# Patient Record
Sex: MALE | Race: WHITE
[De-identification: names, ages, dates, MRNs, and addresses within clinical notes are randomized per-mention and may not be internally consistent; named-entity substitution may affect disease eponyms.]

---

## 2018-02-17 ENCOUNTER — HOSPITAL ENCOUNTER (EMERGENCY)
Dept: HOSPITAL 19 - COL.ER | Age: 43
Discharge: HOME | End: 2018-02-17
Payer: OTHER GOVERNMENT

## 2018-02-17 VITALS — HEART RATE: 87 BPM

## 2018-02-17 VITALS — TEMPERATURE: 98.3 F | DIASTOLIC BLOOD PRESSURE: 85 MMHG | SYSTOLIC BLOOD PRESSURE: 144 MMHG

## 2018-02-17 VITALS — WEIGHT: 270.51 LBS | HEIGHT: 72.01 IN | BODY MASS INDEX: 36.64 KG/M2

## 2018-02-17 DIAGNOSIS — Z79.51: ICD-10-CM

## 2018-02-17 DIAGNOSIS — I10: ICD-10-CM

## 2018-02-17 DIAGNOSIS — Z87.891: ICD-10-CM

## 2018-02-17 DIAGNOSIS — Z79.82: ICD-10-CM

## 2018-02-17 DIAGNOSIS — Z71.1: Primary | ICD-10-CM

## 2018-06-06 ENCOUNTER — HOSPITAL ENCOUNTER (OUTPATIENT)
Dept: HOSPITAL 19 - EUO | Age: 43
Discharge: HOME | End: 2018-06-06
Attending: INTERNAL MEDICINE
Payer: OTHER GOVERNMENT

## 2018-06-06 VITALS — SYSTOLIC BLOOD PRESSURE: 119 MMHG | TEMPERATURE: 98.3 F | DIASTOLIC BLOOD PRESSURE: 79 MMHG | HEART RATE: 62 BPM

## 2018-06-06 VITALS — BODY MASS INDEX: 37.8 KG/M2 | WEIGHT: 279.11 LBS | HEIGHT: 72.01 IN

## 2018-06-06 DIAGNOSIS — Z79.899: ICD-10-CM

## 2018-06-06 DIAGNOSIS — J45.40: Primary | ICD-10-CM

## 2018-07-09 ENCOUNTER — HOSPITAL ENCOUNTER (OUTPATIENT)
Dept: HOSPITAL 19 - EUO | Age: 43
End: 2018-07-09
Attending: INTERNAL MEDICINE
Payer: OTHER GOVERNMENT

## 2018-07-09 DIAGNOSIS — J45.40: Primary | ICD-10-CM

## 2020-11-09 ENCOUNTER — HOSPITAL ENCOUNTER (EMERGENCY)
Dept: HOSPITAL 19 - COL.ER | Age: 45
Discharge: HOME | End: 2020-11-09
Attending: EMERGENCY MEDICINE
Payer: OTHER GOVERNMENT

## 2020-11-09 VITALS — TEMPERATURE: 98.9 F

## 2020-11-09 VITALS — DIASTOLIC BLOOD PRESSURE: 107 MMHG | SYSTOLIC BLOOD PRESSURE: 140 MMHG | HEART RATE: 70 BPM

## 2020-11-09 VITALS — WEIGHT: 275.58 LBS | HEIGHT: 72.01 IN | BODY MASS INDEX: 37.33 KG/M2

## 2020-11-09 DIAGNOSIS — J45.909: ICD-10-CM

## 2020-11-09 DIAGNOSIS — U07.1: Primary | ICD-10-CM

## 2020-11-09 DIAGNOSIS — Z79.82: ICD-10-CM

## 2020-11-09 DIAGNOSIS — I10: ICD-10-CM

## 2021-04-19 ENCOUNTER — HOSPITAL ENCOUNTER (EMERGENCY)
Dept: HOSPITAL 19 - COL.ER | Age: 46
Discharge: HOME | End: 2021-04-19
Attending: EMERGENCY MEDICINE
Payer: OTHER GOVERNMENT

## 2021-04-19 VITALS — DIASTOLIC BLOOD PRESSURE: 122 MMHG | TEMPERATURE: 98 F | SYSTOLIC BLOOD PRESSURE: 201 MMHG

## 2021-04-19 VITALS — HEART RATE: 72 BPM

## 2021-04-19 VITALS — WEIGHT: 300.71 LBS | BODY MASS INDEX: 40.73 KG/M2 | HEIGHT: 72.01 IN

## 2021-04-19 DIAGNOSIS — Z79.82: ICD-10-CM

## 2021-04-19 DIAGNOSIS — M25.561: Primary | ICD-10-CM

## 2021-04-19 DIAGNOSIS — Z79.51: ICD-10-CM

## 2021-04-19 PROCEDURE — 31869: CPT

## 2021-04-19 PROCEDURE — L1830 KO IMMOB CANVAS LONG PRE OTS: HCPCS

## 2021-04-19 PROCEDURE — L1846 KO W ADJ FLEX/EXT ROTAT MOLD: HCPCS

## 2023-04-19 ENCOUNTER — HOSPITAL ENCOUNTER (INPATIENT)
Dept: HOSPITAL 19 - COL.ER | Age: 48
LOS: 4 days | Discharge: HOME | DRG: 438 | End: 2023-04-23
Attending: INTERNAL MEDICINE | Admitting: INTERNAL MEDICINE
Payer: COMMERCIAL

## 2023-04-19 VITALS — SYSTOLIC BLOOD PRESSURE: 159 MMHG

## 2023-04-19 VITALS — DIASTOLIC BLOOD PRESSURE: 88 MMHG | SYSTOLIC BLOOD PRESSURE: 147 MMHG | HEART RATE: 96 BPM | TEMPERATURE: 98.2 F

## 2023-04-19 VITALS — HEART RATE: 70 BPM | DIASTOLIC BLOOD PRESSURE: 94 MMHG | SYSTOLIC BLOOD PRESSURE: 161 MMHG | TEMPERATURE: 97.4 F

## 2023-04-19 VITALS — SYSTOLIC BLOOD PRESSURE: 137 MMHG

## 2023-04-19 VITALS — TEMPERATURE: 97.6 F | HEART RATE: 100 BPM | SYSTOLIC BLOOD PRESSURE: 137 MMHG | DIASTOLIC BLOOD PRESSURE: 96 MMHG

## 2023-04-19 VITALS — DIASTOLIC BLOOD PRESSURE: 95 MMHG | TEMPERATURE: 98.3 F | SYSTOLIC BLOOD PRESSURE: 160 MMHG | HEART RATE: 70 BPM

## 2023-04-19 VITALS — DIASTOLIC BLOOD PRESSURE: 82 MMHG | SYSTOLIC BLOOD PRESSURE: 159 MMHG

## 2023-04-19 VITALS — TEMPERATURE: 97.7 F | SYSTOLIC BLOOD PRESSURE: 157 MMHG | DIASTOLIC BLOOD PRESSURE: 95 MMHG | HEART RATE: 102 BPM

## 2023-04-19 VITALS — HEART RATE: 85 BPM | TEMPERATURE: 98 F | DIASTOLIC BLOOD PRESSURE: 91 MMHG | SYSTOLIC BLOOD PRESSURE: 180 MMHG

## 2023-04-19 VITALS — WEIGHT: 315 LBS | HEIGHT: 72.01 IN | BODY MASS INDEX: 42.66 KG/M2

## 2023-04-19 VITALS — SYSTOLIC BLOOD PRESSURE: 157 MMHG

## 2023-04-19 VITALS — SYSTOLIC BLOOD PRESSURE: 160 MMHG | DIASTOLIC BLOOD PRESSURE: 95 MMHG | HEART RATE: 70 BPM | TEMPERATURE: 98.3 F

## 2023-04-19 DIAGNOSIS — J45.909: ICD-10-CM

## 2023-04-19 DIAGNOSIS — I10: ICD-10-CM

## 2023-04-19 DIAGNOSIS — F43.10: ICD-10-CM

## 2023-04-19 DIAGNOSIS — N17.9: ICD-10-CM

## 2023-04-19 DIAGNOSIS — K85.20: Primary | ICD-10-CM

## 2023-04-19 DIAGNOSIS — E66.9: ICD-10-CM

## 2023-04-19 DIAGNOSIS — K76.0: ICD-10-CM

## 2023-04-19 DIAGNOSIS — F10.90: ICD-10-CM

## 2023-04-19 DIAGNOSIS — E83.42: ICD-10-CM

## 2023-04-19 DIAGNOSIS — K55.059: ICD-10-CM

## 2023-04-19 DIAGNOSIS — Z79.82: ICD-10-CM

## 2023-04-19 LAB
ALBUMIN SERPL-MCNC: 3.6 GM/DL (ref 3.5–5)
ALP SERPL-CCNC: 71 U/L (ref 40–150)
ALT SERPL-CCNC: 98 U/L (ref 0–55)
ANION GAP SERPL CALC-SCNC: 12 MMOL/L (ref 7–16)
AST SERPL-CCNC: 110 U/L (ref 5–34)
BASOPHILS # BLD: 0.1 K/MM3 (ref 0–0.2)
BASOPHILS NFR BLD AUTO: 0.8 % (ref 0–2)
BILIRUB SERPL-MCNC: 1 MG/DL (ref 0.2–1.2)
BUN SERPL-MCNC: 20 MG/DL (ref 9–21)
CALCIUM SERPL-MCNC: 8.8 MG/DL (ref 8.4–10.2)
CHLORIDE SERPL-SCNC: 105 MMOL/L (ref 98–107)
CHOLEST SPEC-SCNC: 165 MG/DL (ref 0–199)
CHOLEST/HDLC SERPL-SRTO: 5
CO2 SERPL-SCNC: 20 MMOL/L (ref 22–29)
CREAT SERPL-SCNC: 0.9 MG/DL (ref 0.72–1.25)
EOSINOPHIL # BLD: 0.5 K/MM3 (ref 0–0.7)
EOSINOPHIL NFR BLD: 5.2 % (ref 0–4)
ERYTHROCYTE [DISTWIDTH] IN BLOOD BY AUTOMATED COUNT: 11.8 % (ref 11.5–14.5)
GLUCOSE SERPL-MCNC: 139 MG/DL (ref 70–99)
GRANULOCYTES # BLD AUTO: 62.2 % (ref 42.2–75.2)
HCT VFR BLD AUTO: 43.6 % (ref 42–52)
HDLC SERPL-MCNC: 33 MG/DL (ref 40–60)
HGB BLD-MCNC: 15.3 G/DL (ref 13.5–18)
LDLC SERPL-MCNC: 35 MG/DL
LIPASE SERPL-CCNC: 290 U/L (ref 8–78)
LYMPHOCYTES # BLD: 2.1 K/MM3 (ref 1.2–3.4)
LYMPHOCYTES NFR BLD: 21.3 % (ref 20–51)
MCH RBC QN AUTO: 36 PG (ref 27–31)
MCHC RBC AUTO-ENTMCNC: 35 G/DL (ref 33–37)
MCV RBC AUTO: 102 FL (ref 80–100)
MONOCYTES # BLD: 1 K/MM3 (ref 0.1–0.6)
MONOCYTES NFR BLD AUTO: 10.2 % (ref 1.7–9.3)
NEUTROPHILS # BLD: 6.2 K/MM3 (ref 1.4–6.5)
PH UR STRIP.AUTO: 5 [PH] (ref 5–8.5)
PLATELET # BLD AUTO: 177 K/MM3 (ref 130–400)
PMV BLD AUTO: 9.4 FL (ref 7.4–10.4)
POTASSIUM SERPL-SCNC: 4.3 MMOL/L (ref 3.5–4.5)
PROT SERPL-MCNC: 6.8 GM/DL (ref 6.2–8.1)
RBC # BLD AUTO: 4.29 M/MM3 (ref 4.2–5.6)
RBC # UR: (no result) /HPF (ref 0–2)
SODIUM SERPL-SCNC: 137 MMOL/L (ref 136–145)
SP GR UR STRIP.AUTO: >=1.03 (ref 1–1.03)
SQUAMOUS # URNS: (no result) /HPF (ref 0–10)
TRIGL SERPL-MCNC: 485 MG/DL (ref 0–149)
URN COLLECT METHOD CLASS: (no result)
UROBILINOGEN UR STRIP.AUTO-MCNC: 0.2 E.U/DL (ref 0.2–1)

## 2023-04-19 NOTE — NUR
PCA pump of dilaudid initiated at 11:48 , instruction given to patient on the
use of the medication. Patient verbalized understanding.

## 2023-04-19 NOTE — NUR
Patient c/o of severe pain at RUQ. Dr. Madrigal at the bedside and gave a verbal
order to change PCA Basal rate to 0.3 and increase it to 0.4 if patient is
still in severe pain and not getting relief. PCA basal rate changed to 0.3 per
orders. See e-mar for changes.

## 2023-04-19 NOTE — NUR
SW met with pt for intake. Pt reports living in [place], [place] with spouse
Miley who is identified as NOK and was present. Confirmed phone number as
[phone]. Pt reports having 3 sets of stairs at home with about 12 steps
each and having challenges getting up and down them. Pt denies using any DME
at home or oxygen. Pt reports PCP as Dr. Cota and confirmed insurance as [employer]
and . Pt reports using [employer] and does not have any issues. Pt
would like resources to assist discontinuing drinking. Pt would like to fill
out DPOA paperwork and is aware of need 2 individuals present to sign. SW
provided resource list and DPOA paperwork.
 
 
******Discharge plan*****
 
Home with wife

## 2023-04-19 NOTE — NUR
Patient maxed up his pca and replaced a new one at 1636. Patient states his
pain level is 7/10. Basal rate increased to 0.4 per verbal order from Dr. Madrigal.

## 2023-04-19 NOTE — NUR
Initial visit; Patient and his wife both thanked  for stopping though
said Pat wasn't receptive to prayer or Spiritual Care at this time.

## 2023-04-19 NOTE — NUR
Patient up in a semi lutz position in bed. IVF infusing without difficulty.
Assessment completed and patient reports of severe pain at RUQ. Patient rated
pain level 8/10 and dilaudid administered. Patient was informed to notify
nurse and pain does not subside and can have pain medicaiton 30 minutes per
parameter. Patient verbalized understanding. Call bell within reach and family
member at the bedside.

## 2023-04-20 VITALS — HEART RATE: 98 BPM | TEMPERATURE: 98.5 F | DIASTOLIC BLOOD PRESSURE: 69 MMHG | SYSTOLIC BLOOD PRESSURE: 123 MMHG

## 2023-04-20 VITALS — SYSTOLIC BLOOD PRESSURE: 147 MMHG | HEART RATE: 94 BPM | DIASTOLIC BLOOD PRESSURE: 90 MMHG | TEMPERATURE: 98.3 F

## 2023-04-20 VITALS — DIASTOLIC BLOOD PRESSURE: 62 MMHG | TEMPERATURE: 98.3 F | SYSTOLIC BLOOD PRESSURE: 148 MMHG | HEART RATE: 98 BPM

## 2023-04-20 VITALS — SYSTOLIC BLOOD PRESSURE: 123 MMHG

## 2023-04-20 VITALS — SYSTOLIC BLOOD PRESSURE: 123 MMHG | HEART RATE: 96 BPM | TEMPERATURE: 99.3 F | DIASTOLIC BLOOD PRESSURE: 73 MMHG

## 2023-04-20 VITALS — SYSTOLIC BLOOD PRESSURE: 152 MMHG | DIASTOLIC BLOOD PRESSURE: 79 MMHG | TEMPERATURE: 99.2 F | HEART RATE: 102 BPM

## 2023-04-20 VITALS — SYSTOLIC BLOOD PRESSURE: 157 MMHG | TEMPERATURE: 97.6 F | HEART RATE: 95 BPM | DIASTOLIC BLOOD PRESSURE: 86 MMHG

## 2023-04-20 VITALS — SYSTOLIC BLOOD PRESSURE: 152 MMHG

## 2023-04-20 VITALS — SYSTOLIC BLOOD PRESSURE: 147 MMHG

## 2023-04-20 VITALS — SYSTOLIC BLOOD PRESSURE: 157 MMHG

## 2023-04-20 LAB
ALBUMIN SERPL-MCNC: 3.5 GM/DL (ref 3.5–5)
ALP SERPL-CCNC: 67 U/L (ref 40–150)
ALT SERPL-CCNC: 73 U/L (ref 0–55)
ANION GAP SERPL CALC-SCNC: 12 MMOL/L (ref 7–16)
AST SERPL-CCNC: 65 U/L (ref 5–34)
BILIRUB SERPL-MCNC: 1.9 MG/DL (ref 0.2–1.2)
BUN SERPL-MCNC: 23 MG/DL (ref 9–21)
CALCIUM SERPL-MCNC: 8.5 MG/DL (ref 8.4–10.2)
CHLORIDE SERPL-SCNC: 103 MMOL/L (ref 98–107)
CO2 SERPL-SCNC: 23 MMOL/L (ref 22–29)
CREAT SERPL-SCNC: 1.18 MG/DL (ref 0.72–1.25)
EOSINOPHIL NFR BLD: 1 % (ref 0–4)
ERYTHROCYTE [DISTWIDTH] IN BLOOD BY AUTOMATED COUNT: 12.3 % (ref 11.5–14.5)
GLUCOSE SERPL-MCNC: 154 MG/DL (ref 70–99)
HCT VFR BLD AUTO: 44.4 % (ref 42–52)
HGB BLD-MCNC: 14.5 G/DL (ref 13.5–18)
HYPOCHROMIA BLD QL SMEAR: (no result)
LYMPHOCYTES NFR BLD MANUAL: 11 % (ref 20–51)
MACROCYTES BLD QL SMEAR: (no result)
MAGNESIUM SERPL-MCNC: 1.6 MG/DL (ref 1.6–2.6)
MCH RBC QN AUTO: 35 PG (ref 27–31)
MCHC RBC AUTO-ENTMCNC: 33 G/DL (ref 33–37)
MCV RBC AUTO: 108 FL (ref 80–100)
MONOCYTES NFR BLD: 12 % (ref 1.7–9.3)
NEUTS BAND NFR BLD: 15 % (ref 0–10)
NEUTS SEG NFR BLD MANUAL: 61 % (ref 42–75.2)
PLATELET # BLD AUTO: 171 K/MM3 (ref 130–400)
PLATELET BLD QL SMEAR: NORMAL
PMV BLD AUTO: 9.7 FL (ref 7.4–10.4)
POTASSIUM SERPL-SCNC: 4.5 MMOL/L (ref 3.5–4.5)
PROT SERPL-MCNC: 6.9 GM/DL (ref 6.2–8.1)
RBC # BLD AUTO: 4.12 M/MM3 (ref 4.2–5.6)
SODIUM SERPL-SCNC: 138 MMOL/L (ref 136–145)
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
## 2023-04-20 NOTE — NUR
Spoke with PENNY Fields about patients CPAP. Patient reports he has not been
able to sleep well due to not wearing his CPAP and being diagnosed with severe
MARISELA. States "staff" told him he could not wear it while on the PCA. PENNY Fields approved wearing of CPAP. Spoke with RT Jack to notify of patient being
placed on home CPAP. States end tidal monitor can be shut off while on CPAP.
Instructed patient to notify this nurse if decides to remove CPAP. Verbalizes
understanding. Call light in reach. Will monitor.

## 2023-04-20 NOTE — NUR
PCA PUMPED CLEARED AT THIS TIME. WHEN CLEARNING PUMP, NOTED PUMP HAD NOT BEEN
CLEARED. DOCUMENTED TOTAL INTAKE.

## 2023-04-21 VITALS — DIASTOLIC BLOOD PRESSURE: 79 MMHG | SYSTOLIC BLOOD PRESSURE: 153 MMHG | HEART RATE: 78 BPM | TEMPERATURE: 98.6 F

## 2023-04-21 VITALS — DIASTOLIC BLOOD PRESSURE: 76 MMHG | HEART RATE: 102 BPM | SYSTOLIC BLOOD PRESSURE: 116 MMHG | TEMPERATURE: 99.2 F

## 2023-04-21 VITALS — DIASTOLIC BLOOD PRESSURE: 59 MMHG | SYSTOLIC BLOOD PRESSURE: 159 MMHG | TEMPERATURE: 98.2 F | HEART RATE: 100 BPM

## 2023-04-21 VITALS — SYSTOLIC BLOOD PRESSURE: 167 MMHG

## 2023-04-21 VITALS — TEMPERATURE: 98 F | DIASTOLIC BLOOD PRESSURE: 91 MMHG | HEART RATE: 100 BPM | SYSTOLIC BLOOD PRESSURE: 150 MMHG

## 2023-04-21 VITALS — SYSTOLIC BLOOD PRESSURE: 150 MMHG

## 2023-04-21 VITALS — SYSTOLIC BLOOD PRESSURE: 159 MMHG

## 2023-04-21 VITALS — SYSTOLIC BLOOD PRESSURE: 125 MMHG | TEMPERATURE: 98.5 F | DIASTOLIC BLOOD PRESSURE: 70 MMHG | HEART RATE: 77 BPM

## 2023-04-21 VITALS — SYSTOLIC BLOOD PRESSURE: 143 MMHG | TEMPERATURE: 98.4 F | HEART RATE: 91 BPM | DIASTOLIC BLOOD PRESSURE: 78 MMHG

## 2023-04-21 VITALS — SYSTOLIC BLOOD PRESSURE: 116 MMHG

## 2023-04-21 VITALS — SYSTOLIC BLOOD PRESSURE: 143 MMHG

## 2023-04-21 LAB
ALBUMIN SERPL-MCNC: 3.1 GM/DL (ref 3.5–5)
ALP SERPL-CCNC: 57 U/L (ref 40–150)
ALT SERPL-CCNC: 47 U/L (ref 0–55)
ANION GAP SERPL CALC-SCNC: 11 MMOL/L (ref 7–16)
AST SERPL-CCNC: 36 U/L (ref 5–34)
BASOPHILS NFR BLD MANUAL: 1 % (ref 0–2)
BILIRUB SERPL-MCNC: 1.6 MG/DL (ref 0.2–1.2)
BUN SERPL-MCNC: 33 MG/DL (ref 9–21)
CALCIUM SERPL-MCNC: 8.3 MG/DL (ref 8.4–10.2)
CHLORIDE SERPL-SCNC: 105 MMOL/L (ref 98–107)
CO2 SERPL-SCNC: 21 MMOL/L (ref 22–29)
CREAT SERPL-SCNC: 3.39 MG/DL (ref 0.72–1.25)
CREAT SERPL-SCNC: 3.43 MG/DL (ref 0.72–1.25)
EOSINOPHIL NFR BLD: 2 % (ref 0–4)
ERYTHROCYTE [DISTWIDTH] IN BLOOD BY AUTOMATED COUNT: 12.4 % (ref 11.5–14.5)
FRACT EXCRET NA UR+SERPL-RTO: 0.24 %
GLUCOSE SERPL-MCNC: 146 MG/DL (ref 70–99)
HCT VFR BLD AUTO: 40.2 % (ref 42–52)
HGB BLD-MCNC: 13.4 G/DL (ref 13.5–18)
LYMPHOCYTES NFR BLD MANUAL: 13 % (ref 20–51)
MACROCYTES BLD QL SMEAR: (no result)
MAGNESIUM SERPL-MCNC: 2.4 MG/DL (ref 1.6–2.6)
MCH RBC QN AUTO: 36 PG (ref 27–31)
MCHC RBC AUTO-ENTMCNC: 33 G/DL (ref 33–37)
MCV RBC AUTO: 108 FL (ref 80–100)
MONOCYTES NFR BLD: 14 % (ref 1.7–9.3)
NEUTS BAND NFR BLD: 4 % (ref 0–10)
NEUTS SEG NFR BLD MANUAL: 66 % (ref 42–75.2)
PLATELET # BLD AUTO: 166 K/MM3 (ref 130–400)
PLATELET BLD QL SMEAR: NORMAL
PMV BLD AUTO: 9.7 FL (ref 7.4–10.4)
POTASSIUM SERPL-SCNC: 4.5 MMOL/L (ref 3.5–4.5)
PROT SERPL-MCNC: 6.8 GM/DL (ref 6.2–8.1)
RBC # BLD AUTO: 3.72 M/MM3 (ref 4.2–5.6)
SODIUM SERPL-SCNC: 137 MMOL/L (ref 136–145)
SODIUM SERPL-SCNC: 137 MMOL/L (ref 136–145)

## 2023-04-21 NOTE — NUR
THIS RN DISCONNECTED PT FROM DILAUDID PCA AT THIS TIME. DISCUSSED WITH PT THE
NEED FOR ACCURATE INTAKE AND OUTPUT AND ORDER FOR PATHAK. PT STATES HE WOULD
LIKE TO AVOID PATHAK UNLESS COMPLETELY NECESSARY, DISCUSSED WITH DR. RIVERA AND
HE IS OK WITH USING A HAT/URINAL AT THIS TIME TO MONITOR I&O. WILL CONTINUE TO
FOLLOW.

## 2023-04-21 NOTE — NUR
Patient had an uneventful night and rested better with CPAP on. Dilaudid PCA
still in use with good pain control. NS@125ml/hr infusing to left AC INT
without difficulty. VS remained stable. No nausea this shift and tolerated ice
chips. Denies current questions/concerns. Call light in reach. Will monitor.

## 2023-04-21 NOTE — NUR
PT RESTING IN BED THIS MORNING. SHIFT ASSESSMENT COMPLETED. MORNING
MEDICATIONS GIVEN BY STUDENT RN, PARRIS. THIS RN RESET PCA PUMP WITH NEW
DILAUDID SYRINGE. PT REPORTS PAIN TO RUQ WITH MOVEMENT. DENIES ANY OTHER NEEDS
AT THIS TIME. IVF INFUSING, CALL LIGHT WITHIN REACH. WILL CONTINUE TO MONITOR.

## 2023-04-21 NOTE — NUR
PT SAO2 89-90% ON RA/AWAKE.  DISCUSSED WITH PT AND PT'S WIFE, HE CAN WEAR NC
OR OUR CPAP W/O2 TONIGHT.  PT OPTED FOR OUR CPAP.  DISCUSSED WITH PT, PT'S
WIFE AND ILEANA KO THAT PT MAY REQUIRE HOME O2 FOR CPAP.

## 2023-04-21 NOTE — NUR
Telehealth visit conducted with Dr. Chris Mosquera, Nephrologist.
Patient consented to visit.  Telecommunication initiated without any
difficulties during exam.  All questions were answered by Dr. Douglas.

## 2023-04-22 VITALS — DIASTOLIC BLOOD PRESSURE: 86 MMHG | HEART RATE: 60 BPM | SYSTOLIC BLOOD PRESSURE: 167 MMHG | TEMPERATURE: 98.6 F

## 2023-04-22 VITALS — SYSTOLIC BLOOD PRESSURE: 167 MMHG | HEART RATE: 85 BPM | TEMPERATURE: 98.5 F | DIASTOLIC BLOOD PRESSURE: 83 MMHG

## 2023-04-22 VITALS — SYSTOLIC BLOOD PRESSURE: 164 MMHG | HEART RATE: 674 BPM | TEMPERATURE: 98.6 F | DIASTOLIC BLOOD PRESSURE: 84 MMHG

## 2023-04-22 VITALS — HEART RATE: 82 BPM | SYSTOLIC BLOOD PRESSURE: 163 MMHG | TEMPERATURE: 98.5 F | DIASTOLIC BLOOD PRESSURE: 88 MMHG

## 2023-04-22 VITALS — TEMPERATURE: 98.4 F | DIASTOLIC BLOOD PRESSURE: 93 MMHG | SYSTOLIC BLOOD PRESSURE: 161 MMHG | HEART RATE: 77 BPM

## 2023-04-22 VITALS — SYSTOLIC BLOOD PRESSURE: 167 MMHG

## 2023-04-22 VITALS — SYSTOLIC BLOOD PRESSURE: 151 MMHG | HEART RATE: 81 BPM | TEMPERATURE: 97.7 F | DIASTOLIC BLOOD PRESSURE: 71 MMHG

## 2023-04-22 VITALS — SYSTOLIC BLOOD PRESSURE: 161 MMHG

## 2023-04-22 LAB
ALBUMIN SERPL-MCNC: 2.5 GM/DL (ref 3.5–5)
ALP SERPL-CCNC: 56 U/L (ref 40–150)
ALT SERPL-CCNC: 35 U/L (ref 0–55)
ANION GAP SERPL CALC-SCNC: 7 MMOL/L (ref 7–16)
AST SERPL-CCNC: 33 U/L (ref 5–34)
BASOPHILS # BLD: 0 K/MM3 (ref 0–0.2)
BASOPHILS NFR BLD AUTO: 0.3 % (ref 0–2)
BILIRUB SERPL-MCNC: 1 MG/DL (ref 0.2–1.2)
BUN SERPL-MCNC: 24 MG/DL (ref 9–21)
CALCIUM SERPL-MCNC: 8.2 MG/DL (ref 8.4–10.2)
CHLORIDE SERPL-SCNC: 106 MMOL/L (ref 98–107)
CO2 SERPL-SCNC: 24 MMOL/L (ref 22–29)
CREAT SERPL-SCNC: 1.59 MG/DL (ref 0.72–1.25)
EOSINOPHIL # BLD: 0.3 K/MM3 (ref 0–0.7)
EOSINOPHIL NFR BLD: 2.4 % (ref 0–4)
ERYTHROCYTE [DISTWIDTH] IN BLOOD BY AUTOMATED COUNT: 12 % (ref 11.5–14.5)
GLUCOSE SERPL-MCNC: 177 MG/DL (ref 70–99)
GRANULOCYTES # BLD AUTO: 74.3 % (ref 42.2–75.2)
HCT VFR BLD AUTO: 36.6 % (ref 42–52)
HGB BLD-MCNC: 12.2 G/DL (ref 13.5–18)
LYMPHOCYTES # BLD: 1.2 K/MM3 (ref 1.2–3.4)
LYMPHOCYTES NFR BLD: 10.3 % (ref 20–51)
MAGNESIUM SERPL-MCNC: 2.2 MG/DL (ref 1.6–2.6)
MCH RBC QN AUTO: 36 PG (ref 27–31)
MCHC RBC AUTO-ENTMCNC: 33 G/DL (ref 33–37)
MCV RBC AUTO: 108 FL (ref 80–100)
MONOCYTES # BLD: 1.3 K/MM3 (ref 0.1–0.6)
MONOCYTES NFR BLD AUTO: 11.7 % (ref 1.7–9.3)
NEUTROPHILS # BLD: 8.4 K/MM3 (ref 1.4–6.5)
PLATELET # BLD AUTO: 129 K/MM3 (ref 130–400)
PMV BLD AUTO: 10.1 FL (ref 7.4–10.4)
POTASSIUM SERPL-SCNC: 4.3 MMOL/L (ref 3.5–4.5)
PROT SERPL-MCNC: 6 GM/DL (ref 6.2–8.1)
RBC # BLD AUTO: 3.4 M/MM3 (ref 4.2–5.6)
SODIUM SERPL-SCNC: 137 MMOL/L (ref 136–145)

## 2023-04-22 NOTE — NUR
PT BACK FROM BATHROOM. STATES HE HAD A SMALL BM. PT IS AXOX4. PT DENIES
NAUSEA, BUT DOES STATE HIS STOMACH FEELS UPSET. PT'S IV LEAKING. IV
REPOSITIONED AND REDRESSED. PT HAS CALL LIGHT AND INSTRUCTED TO CALL WITH ALL
NEEDS.

## 2023-04-22 NOTE — NUR
PTS WIFE CAME TO NURSES STATION SAYING PT SEEMS GROGGY. THIS RN WENT TO ROOM
TO ASSESS PT. PT WAS RESTING IN BED. PT AWOKE TO VOICE, PT ANSWERS ORIENTATION
QUESITONS APPROPRIATLY. VSS. PT COMPLAINS OF A HEADACHE AND ALL OVER ABD ACHE.
 NOTIFIED. TYLENOL GIVEN. PT DOES HAVE A HX OF ETOH DAILY. DISCUSSED
WITH PT AND HIS WIFE THIS COULD BE SOME SIGNS OF DETOX.  AWARE.

## 2023-04-22 NOTE — NUR
Patient moved to room 349. He showered. Tolerating clears. denies needs. Night
shiftnurse to resume cares

## 2023-04-22 NOTE — NUR
Shift assessment performed- see documentation. 2+ pitting edema present on
bilateral lower extremeties. Lung sounds continue to be clear. He is using his
CPAP for sleeping. At 2200 he reported feeling diaphoretic but the sweating
resolved. He initally reported no pain. At about 0330 he complained of a
headache. PRN tylenol was administered as ordered. He reports having a small
marble sized BM and states that he is feeling constipated. He wanted to wait
until his colace that is ordered next for 0900. He is tolerating the clear
liquid diet. He denies nausea. His output is minimal and his urine is an
teresa/peach color. Strict I&Os being observed.

## 2023-04-23 VITALS — HEART RATE: 77 BPM | TEMPERATURE: 98.6 F | DIASTOLIC BLOOD PRESSURE: 56 MMHG | SYSTOLIC BLOOD PRESSURE: 131 MMHG

## 2023-04-23 VITALS — SYSTOLIC BLOOD PRESSURE: 158 MMHG | HEART RATE: 75 BPM | DIASTOLIC BLOOD PRESSURE: 86 MMHG | TEMPERATURE: 98.8 F

## 2023-04-23 VITALS — HEART RATE: 83 BPM | SYSTOLIC BLOOD PRESSURE: 153 MMHG | DIASTOLIC BLOOD PRESSURE: 82 MMHG

## 2023-04-23 VITALS — SYSTOLIC BLOOD PRESSURE: 158 MMHG

## 2023-04-23 VITALS — SYSTOLIC BLOOD PRESSURE: 161 MMHG

## 2023-04-23 LAB
ANION GAP SERPL CALC-SCNC: 8 MMOL/L (ref 7–16)
BASOPHILS # BLD: 0 K/MM3 (ref 0–0.2)
BASOPHILS NFR BLD AUTO: 0.3 % (ref 0–2)
BUN SERPL-MCNC: 11 MG/DL (ref 9–21)
CALCIUM SERPL-MCNC: 8.5 MG/DL (ref 8.4–10.2)
CHLORIDE SERPL-SCNC: 104 MMOL/L (ref 98–107)
CO2 SERPL-SCNC: 27 MMOL/L (ref 22–29)
CREAT SERPL-SCNC: 0.86 MG/DL (ref 0.72–1.25)
EOSINOPHIL # BLD: 0.3 K/MM3 (ref 0–0.7)
EOSINOPHIL NFR BLD: 3.7 % (ref 0–4)
ERYTHROCYTE [DISTWIDTH] IN BLOOD BY AUTOMATED COUNT: 11.9 % (ref 11.5–14.5)
GLUCOSE SERPL-MCNC: 131 MG/DL (ref 70–99)
GRANULOCYTES # BLD AUTO: 64.5 % (ref 42.2–75.2)
HCT VFR BLD AUTO: 37.9 % (ref 42–52)
HGB BLD-MCNC: 13 G/DL (ref 13.5–18)
LYMPHOCYTES # BLD: 1.5 K/MM3 (ref 1.2–3.4)
LYMPHOCYTES NFR BLD: 16.1 % (ref 20–51)
MCH RBC QN AUTO: 36 PG (ref 27–31)
MCHC RBC AUTO-ENTMCNC: 34 G/DL (ref 33–37)
MCV RBC AUTO: 104 FL (ref 80–100)
MONOCYTES # BLD: 1.3 K/MM3 (ref 0.1–0.6)
MONOCYTES NFR BLD AUTO: 14.4 % (ref 1.7–9.3)
NEUTROPHILS # BLD: 6 K/MM3 (ref 1.4–6.5)
PLATELET # BLD AUTO: 147 K/MM3 (ref 130–400)
PMV BLD AUTO: 9.7 FL (ref 7.4–10.4)
POTASSIUM SERPL-SCNC: 3.8 MMOL/L (ref 3.5–4.5)
RBC # BLD AUTO: 3.66 M/MM3 (ref 4.2–5.6)
SODIUM SERPL-SCNC: 139 MMOL/L (ref 136–145)

## 2023-04-23 NOTE — NUR
Discharge paperwork reviewed with the patient and wife at the bedside. Patient
ambulated to awaiting vehicle independently. No further needs expressed

## 2023-04-23 NOTE — NUR
rounds: Patient had visitor. Patient was in bed, smiling. 
commented that he appeared to feel well today. Patient stated he was feeling
better. Politely declined  visit.

## 2023-04-23 NOTE — NUR
Agree with students assessment of the patient. Patient A&Ox4. VSS. Independent
in the room. Tolerating clear liquid diet. Call light within reach

## 2023-04-23 NOTE — NUR
Shift assessment performed- see documentation. Pt states that he has been
urinating more often and is having BMs again. His urine is an orange color but
lighter than it was last night. He denies pain at this time. He continues to
have high BP readings. PRN apresoline given as ordered when indicated- see
eMAR. He is tolerating a clear liquid diet well. He wears his CPAP at night
when sleeping. Bed in lowest position and call light within reach

## 2023-04-23 NOTE — NUR
IV SITE WAS SLIGHTY REDENNED AND A LITTLE PUFFY; AFTER ADMINISTERING MERREM WE
DISCONITINUED IV PER RICHELLE